# Patient Record
Sex: MALE | Race: WHITE | NOT HISPANIC OR LATINO | Employment: FULL TIME | URBAN - METROPOLITAN AREA
[De-identification: names, ages, dates, MRNs, and addresses within clinical notes are randomized per-mention and may not be internally consistent; named-entity substitution may affect disease eponyms.]

---

## 2017-10-04 ENCOUNTER — OFFICE VISIT (OUTPATIENT)
Dept: ORTHOPEDIC SURGERY | Facility: CLINIC | Age: 37
End: 2017-10-04

## 2017-10-04 VITALS
BODY MASS INDEX: 33.07 KG/M2 | DIASTOLIC BLOOD PRESSURE: 85 MMHG | HEIGHT: 70 IN | SYSTOLIC BLOOD PRESSURE: 143 MMHG | WEIGHT: 231 LBS | HEART RATE: 70 BPM

## 2017-10-04 DIAGNOSIS — G89.29 CHRONIC RIGHT SHOULDER PAIN: Primary | ICD-10-CM

## 2017-10-04 DIAGNOSIS — M25.511 CHRONIC RIGHT SHOULDER PAIN: Primary | ICD-10-CM

## 2017-10-04 PROCEDURE — 99203 OFFICE O/P NEW LOW 30 MIN: CPT | Performed by: ORTHOPAEDIC SURGERY

## 2017-10-04 RX ORDER — IBUPROFEN 800 MG/1
800 TABLET ORAL EVERY 6 HOURS PRN
COMMUNITY

## 2017-10-04 NOTE — PROGRESS NOTES
[facility] Note    Subjective     Chief Complaint   Patient presents with   • Right Shoulder - Pain        HPI    Rodrigo Bliss is a 37 y.o. male. He presents today for evaluation of his right shoulder pain, which is been present now for 4 months, following no particular injury.  The pain is continuous, unchanged from its onset, stabbing and pinching, moderate in severity.  He also has some neck pain, and occasional numbness.      There is no problem list on file for this patient.    History reviewed. No pertinent past medical history.   Past Surgical History:   Procedure Laterality Date   • NECK SURGERY      removal of blood vessel tumor      Family History   Problem Relation Age of Onset   • Diabetes Mother    • Heart attack Mother    • Cancer Father      Social History     Social History   • Marital status:      Spouse name: N/A   • Number of children: N/A   • Years of education: N/A     Occupational History   • Not on file.     Social History Main Topics   • Smoking status: Current Every Day Smoker     Packs/day: 1.00     Types: Cigarettes   • Smokeless tobacco: Never Used   • Alcohol use No   • Drug use: No   • Sexual activity: Defer     Other Topics Concern   • Not on file     Social History Narrative   • No narrative on file      No current outpatient prescriptions on file prior to visit.     No current facility-administered medications on file prior to visit.       Allergies   Allergen Reactions   • Penicillins         Review of Systems   Constitutional: Negative for activity change, appetite change, chills, diaphoresis, fatigue, fever and unexpected weight change.   HENT: Negative for congestion, dental problem, drooling, ear discharge, ear pain, facial swelling, hearing loss, mouth sores, nosebleeds, postnasal drip, rhinorrhea, sinus pressure, sneezing, sore throat, tinnitus, trouble swallowing and voice change.    Eyes: Negative for photophobia, pain, discharge, redness,  "itching and visual disturbance.   Respiratory: Negative for apnea, cough, choking, chest tightness, shortness of breath, wheezing and stridor.    Cardiovascular: Negative for chest pain, palpitations and leg swelling.   Gastrointestinal: Negative for abdominal distention, abdominal pain, anal bleeding, blood in stool, constipation, diarrhea, nausea, rectal pain and vomiting.   Endocrine: Negative for cold intolerance, heat intolerance, polydipsia, polyphagia and polyuria.   Genitourinary: Negative for decreased urine volume, difficulty urinating, dysuria, enuresis, flank pain, frequency, genital sores, hematuria and urgency.   Musculoskeletal: Positive for arthralgias, neck pain and neck stiffness. Negative for back pain, gait problem, joint swelling and myalgias.   Skin: Negative for color change, pallor, rash and wound.   Allergic/Immunologic: Negative for environmental allergies, food allergies and immunocompromised state.   Neurological: Positive for numbness. Negative for dizziness, tremors, seizures, syncope, facial asymmetry, speech difficulty, weakness, light-headedness and headaches.   Hematological: Negative for adenopathy. Does not bruise/bleed easily.   Psychiatric/Behavioral: Positive for agitation. Negative for behavioral problems, confusion, decreased concentration, dysphoric mood, hallucinations, self-injury, sleep disturbance and suicidal ideas. The patient is not nervous/anxious and is not hyperactive.         Objective      Physical Exam  /85  Pulse 70  Ht 70\" (177.8 cm)  Wt 231 lb (105 kg)  BMI 33.15 kg/m2    Body mass index is 33.15 kg/(m^2).    General:   Mental Status:  Alert   Appearance: Cooperative, in no acute distress   Build and Nutrition: Overweight male   Orientation: Alert and oriented to person, place and time   Posture: Normal   Gait: Normal    Integument:   Right shoulder: No skin lesions, no rash, no ecchymosis    Neurologic:   Sensation:    Right hand: Intact to light " touch in the digits   Motor:  Right upper extremity: 5/5 deltoid, biceps, triceps, wrist flexors, wrist extensors, and interossei    Vascular:   Right upper extremity: 2+ dorsalis radial pulse, prompt capillary refill    Upper Extremities:   Right Shoulder:    Tenderness:  None    Swelling:  None    Crepitus: None    Atrophy:  None    Range of motion:  External rotation:  70°       Forward flexion:  180°       Abduction:   180°  Instability:  None  Deformities:  None  Functional testing: Negative drop arm, negative lift-off, positive impingement      Imaging/Studies    See my xray report from this day for details. No acute bony abnormalities.    Assessment and Plan     Rodrigo was seen today for pain.    Diagnoses and all orders for this visit:    Chronic right shoulder pain  -     MRI Shoulder Right Without Contrast; Future        I reviewed my findings with patient today.  At this point, recommended an MRI of his shoulder, specifically looking for any evidence of rotator cuff injury.  I will see him back after the MRI for review.  He is also experiencing neck pain and numbness.  There may be a contribution from his neck.  He may need referral to a spine specialist for an evaluation, but we will check his shoulder out first.    Return for After Imaging Study.      Medical Decision Making    Data/Risk: radiology tests and independent visualization of imaging, lab tests, or EMG/NCV      Frederick William MD  10/04/17  11:26 AM

## 2017-10-23 ENCOUNTER — TELEPHONE (OUTPATIENT)
Dept: ORTHOPEDIC SURGERY | Facility: CLINIC | Age: 37
End: 2017-10-23